# Patient Record
Sex: MALE | Race: WHITE | URBAN - METROPOLITAN AREA
[De-identification: names, ages, dates, MRNs, and addresses within clinical notes are randomized per-mention and may not be internally consistent; named-entity substitution may affect disease eponyms.]

---

## 2017-03-10 DIAGNOSIS — E78.5 HYPERLIPIDEMIA LDL GOAL <100: ICD-10-CM

## 2017-03-10 RX ORDER — ATORVASTATIN CALCIUM 20 MG/1
20 TABLET, FILM COATED ORAL DAILY
Qty: 90 TABLET | Refills: 0 | Status: SHIPPED | OUTPATIENT
Start: 2017-03-10 | End: 2017-06-05

## 2017-06-05 DIAGNOSIS — E78.5 HYPERLIPIDEMIA LDL GOAL <100: ICD-10-CM

## 2017-06-05 RX ORDER — ATORVASTATIN CALCIUM 20 MG/1
20 TABLET, FILM COATED ORAL DAILY
Qty: 30 TABLET | Refills: 0 | Status: SHIPPED | OUTPATIENT
Start: 2017-06-05 | End: 2017-06-06

## 2017-06-06 ENCOUNTER — TELEPHONE (OUTPATIENT)
Dept: CARDIOLOGY | Facility: CLINIC | Age: 37
End: 2017-06-06

## 2017-06-06 DIAGNOSIS — E78.5 HYPERLIPIDEMIA: Primary | ICD-10-CM

## 2017-06-06 DIAGNOSIS — E78.5 HYPERLIPIDEMIA LDL GOAL <100: ICD-10-CM

## 2017-06-06 RX ORDER — ATORVASTATIN CALCIUM 20 MG/1
20 TABLET, FILM COATED ORAL DAILY
Qty: 30 TABLET | Refills: 0 | Status: SHIPPED | OUTPATIENT
Start: 2017-06-06 | End: 2017-11-14

## 2017-06-06 NOTE — TELEPHONE ENCOUNTER
Patient called and left a message stating he needed a refill and a lab order. Reviewed chart and patient did need an FLP. Order placed. Also, refilled Lipitor for 30 days. Patient has office visit June 20, 2017 with Dr. Villasenor. Called patient back. He did not answer. Left a detailed message to patient with scheduling's number to schedule the lab and informed him of the refill sent to [facility]. Team 4 number left.

## 2017-06-15 DIAGNOSIS — E78.5 HYPERLIPIDEMIA: ICD-10-CM

## 2017-06-15 LAB
CHOLEST SERPL-MCNC: 170 MG/DL
HDLC SERPL-MCNC: 58 MG/DL
LDLC SERPL CALC-MCNC: 91 MG/DL
NONHDLC SERPL-MCNC: 112 MG/DL
TRIGL SERPL-MCNC: 104 MG/DL

## 2017-06-15 PROCEDURE — 80061 LIPID PANEL: CPT | Performed by: INTERNAL MEDICINE

## 2017-06-15 PROCEDURE — 36415 COLL VENOUS BLD VENIPUNCTURE: CPT | Performed by: INTERNAL MEDICINE

## 2017-06-20 ENCOUNTER — OFFICE VISIT (OUTPATIENT)
Dept: CARDIOLOGY | Facility: CLINIC | Age: 37
End: 2017-06-20
Payer: COMMERCIAL

## 2017-06-20 VITALS
WEIGHT: 173 LBS | HEIGHT: 69 IN | SYSTOLIC BLOOD PRESSURE: 118 MMHG | DIASTOLIC BLOOD PRESSURE: 72 MMHG | HEART RATE: 56 BPM | BODY MASS INDEX: 25.62 KG/M2

## 2017-06-20 DIAGNOSIS — Z82.49 FAMILY HISTORY OF ISCHEMIC HEART DISEASE: ICD-10-CM

## 2017-06-20 DIAGNOSIS — E78.5 HYPERLIPIDEMIA LDL GOAL <100: Primary | ICD-10-CM

## 2017-06-20 PROCEDURE — 99213 OFFICE O/P EST LOW 20 MIN: CPT | Performed by: INTERNAL MEDICINE

## 2017-06-20 NOTE — PROGRESS NOTES
HPI and Plan:   See dictation:[id number]    No orders of the defined types were placed in this encounter.      Orders Placed This Encounter   Medications     Fexofenadine HCl (ALLEGRA ALLERGY PO)     Sig: Take by mouth as needed for allergies       There are no discontinued medications.      Encounter Diagnoses   Name Primary?     Hyperlipidemia LDL goal <100 Yes     Family history of ischemic heart disease        CURRENT MEDICATIONS:  Current Outpatient Prescriptions   Medication Sig Dispense Refill     Fexofenadine HCl (ALLEGRA ALLERGY PO) Take by mouth as needed for allergies       atorvastatin (LIPITOR) 20 MG tablet Take 1 tablet (20 mg) by mouth daily 30 tablet 0     triamcinolone (KENALOG) 0.025 % cream        aspirin EC 81 MG tablet Take 1 tablet (81 mg) by mouth daily 1 tablet 0       ALLERGIES   No Known Allergies    PAST MEDICAL HISTORY:  Past Medical History:   Diagnosis Date     Hyperlipidemia        PAST SURGICAL HISTORY:  History reviewed. No pertinent surgical history.    FAMILY HISTORY:  Family History   Problem Relation Age of Onset     Hypertension Mother      Hyperlipidemia Mother      Coronary Artery Disease Father 50     MI and CABG     Hyperlipidemia Father      Hyperlipidemia Sister      no medications       SOCIAL HISTORY:  Social History     Social History     Marital status:      Spouse name: N/A     Number of children: N/A     Years of education: N/A     Social History Main Topics     Smoking status: Never Smoker     Smokeless tobacco: None     Alcohol use Yes      Comment: 0-3 socially     Drug use: No     Sexual activity: Not Asked     Other Topics Concern     Parent/Sibling W/ Cabg, Mi Or Angioplasty Before 65f 55m? Yes     Caffeine Concern No     2 coffee or dt. coke     Exercise Yes     3x per week      Seat Belt Yes     Social History Narrative       Review of Systems:  Skin:  Negative       Eyes:  Positive for contacts    ENT:  Negative      Respiratory:  Negative      "  Cardiovascular:  Negative      Gastroenterology: Negative      Genitourinary:  not assessed      Musculoskeletal:  Negative      Neurologic:  Negative      Psychiatric:  Negative      Heme/Lymph/Imm:  Positive for allergies    Endocrine:  Negative        Physical Exam:  Vitals: /72 (BP Location: Left arm, Cuff Size: Adult Large)  Pulse 56  Ht 1.753 m (5' 9\")  Wt 78.5 kg (173 lb)  BMI 25.55 kg/m2    Constitutional:  cooperative, alert and oriented, well developed, well nourished, in no acute distress        Skin:  warm and dry to the touch, no apparent skin lesions or masses noted        Head:  normocephalic, no masses or lesions        Eyes:  pupils equal and round, conjunctivae and lids unremarkable, sclera white, no xanthalasma, EOMS intact, no nystagmus        ENT:  no pallor or cyanosis, dentition good        Neck:  carotid pulses are full and equal bilaterally, JVP normal, no carotid bruit, no thyromegaly        Chest:  normal breath sounds, clear to auscultation, normal A-P diameter, normal symmetry, normal respiratory excursion, no use of accessory muscles     tender to palpation sternum    Cardiac: regular rhythm, normal S1/S2, no S3 or S4, apical impulse not displaced, no murmurs, gallops or rubs                  Abdomen:  abdomen soft, non-tender, BS normoactive, no mass, no HSM, no bruits        Vascular: pulses full and equal, no bruits auscultated                                        Extremities and Back:  no deformities, clubbing, cyanosis, erythema observed;no edema              Neurological:  affect appropriate, oriented to time, person and place;no gross motor deficits              CC  [facility]  [address]     [place] [address]              "

## 2017-06-20 NOTE — MR AVS SNAPSHOT
"              After Visit Summary   2017    Alfonzo Estrada    MRN: [id number]           Patient Information     Date Of Birth          1980        Visit Information        Provider Department      2017 7:15 AM Zay Villasenor MD [facility]        Today's Diagnoses     Hyperlipidemia LDL goal <100    -  1    Family history of ischemic heart disease           Follow-ups after your visit        Who to contact     If you have questions or need follow up information about today's clinic visit or your schedule please contact [facility] directly at [phone].  Normal or non-critical lab and imaging results will be communicated to you by [employer], letter or phone within 4 business days after the clinic has received the results. If you do not hear from us within 7 days, please contact the clinic through [employer] or phone. If you have a critical or abnormal lab result, we will notify you by phone as soon as possible.  Submit refill requests through [employer] or call your pharmacy and they will forward the refill request to us. Please allow 3 business days for your refill to be completed.          Additional Information About Your Visit        MyChart Information     [employer] lets you send messages to your doctor, view your test results, renew your prescriptions, schedule appointments and more. To sign up, go to [email or web address] . Click on \"Log in\" on the left side of the screen, which will take you to the Welcome page. Then click on \"Sign up Now\" on the right side of the page.     You will be asked to enter the access code listed below, as well as some personal information. Please follow the directions to create your username and password.     Your access code is: [email or web address]  Expires: 2017  7:39 AM     Your access code will  in 90 days. If you need help or a new code, please call your [facility] " "or [phone].        Care EveryWhere ID     This is your Care EveryWhere ID. This could be used by other organizations to access your [facility] medical records  [id number]        Your Vitals Were     Pulse Height BMI (Body Mass Index)             56 1.753 m (5' 9\") 25.55 kg/m2          Blood Pressure from Last 3 Encounters:   06/20/17 118/72   02/10/16 128/76   11/16/15 120/75    Weight from Last 3 Encounters:   06/20/17 78.5 kg (173 lb)   02/10/16 83.9 kg (185 lb)   11/16/15 84.8 kg (187 lb)              Today, you had the following     No orders found for display       Primary Care Provider Office Phone # Fax #    Monty Hernandez [phone]       [facility] [address]    [place] [address]        Thank you!     Thank you for choosing [facility] [place]  for your care. Our goal is always to provide you with excellent care. Hearing back from our patients is one way we can continue to improve our services. Please take a few minutes to complete the written survey that you may receive in the mail after your visit with us. Thank you!             Your Updated Medication List - Protect others around you: Learn how to safely use, store and throw away your medicines at www.disposemymeds.org.          This list is accurate as of: 6/20/17  7:39 AM.  Always use your most recent med list.                   Brand Name Dispense Instructions for use    ALLEGRA ALLERGY PO      Take by mouth as needed for allergies       aspirin EC 81 MG EC tablet     1 tablet    Take 1 tablet (81 mg) by mouth daily       atorvastatin 20 MG tablet    LIPITOR    30 tablet    Take 1 tablet (20 mg) by mouth daily       triamcinolone 0.025 % cream    KENALOG           "

## 2017-06-20 NOTE — PROGRESS NOTES
PRIMARY CARE PHYSICIAN:  Monty Hernandez MD      HISTORY OF PRESENT ILLNESS:  I again had the pleasure of seeing your patient, Alfonzo Estrada, at [facility] for evaluation of mixed hyperlipidemia.  The patient has familial hypercholesterolemia since the age of 11.  This has improved somewhat with a better diet.  His father developed a myocardial infarction at age 50 and had 3-vessel coronary artery bypass surgery.  The patient's total cholesterol was greater than 200 while unmedicated in college.  With the use of simvastatin, this was significantly improved.  We have placed him on atorvastatin 20 mg, which he has tolerated without myalgias or myopathy.  The patient continues to exercise by running 4-5 times per week.  He is in the middle of a bathroom remodeling project and is exercising a bit less.  Additionally, his medication is not always available in his house because of the remodeling and he is able to take the atorvastatin two-thirds of the time.  He denies palpitations or chest discomfort.  A previous stress echocardiogram 07/09/2012 was normal.  The patient states that his diet consists of increased grains, chicken and turkey, and he is avoiding red meat.  He is currently working for [employer] and is quite active at work.  He is a nonsmoker and does not have a history of hypertension or previous myocardial infarction or stroke.      His most recent fasting lipid profile on 06/15/2017 includes total cholesterol 170, HDL 58, LDL 91, triglycerides 104.      PHYSICAL EXAMINATION:   VITAL SIGNS:  Current blood pressure is 118/72, pulse is 56 and regular, weight is 173 pounds, a decrease of 15 pounds since I last saw him in 11/2015.   HEENT:  Benign without xanthelasma.   NECK:  Supple without thyromegaly or carotid bruit.   CHEST:  Clear to auscultation without wheezes, rales or rhonchi.   CARDIAC:  Regular rate and rhythm without gallop or murmur.  No JVD.  Pulses were all  intact without bruits.   ABDOMEN:  Benign without organomegaly or bruits.   EXTREMITIES:  Without cyanosis, clubbing or edema.      ASSESSMENT:   1.  Alfonzo Eric is a delightful 36-year-old male with history of mixed hyperlipidemia.  Because of his strong family history of premature atherosclerosis, we elected to treat him with a modest dose of atorvastatin.  He has had a remarkable result and continues to do well.  I have suggested to this patient that he continue on this dose of medications.  He can follow with Dr. Monty Hernandez and should have a fasting lipid profile every 2 years.  He will return to my office on a p.r.n. basis.  I have suggested he continue to exercise and complimented him on his current diet as well.  He will also continue with aspirin 81 mg daily.  His LDL goal remains less than 130, although keeping him under 100 seems reasonable with this dose of atorvastatin.      It is my pleasure to assist in the care of Alfonzo Eric.  I will see him on a p.r.n. basis in the future.  I congratulated him on his great health practices.  All his questions were answered to his satisfaction.      Radha Kaur MD      cc:   Monty Hernandez MD   [facility]   [address] , [address] [address]         RAD[facility] KAUR MD, [facility]             D: 2017 07:50   T: 2017 17:19   MT: al      Name:     ALFONZO ERIC   MRN:      [id number]        Account:      [id number]   :      1980           Service Date: 2017      Document: [id number]

## 2017-06-20 NOTE — LETTER
6/20/2017    Monty Hernandez MD  [facility]  [address]   [place] [address]    RE: Alfonzo Estrada       Dear Colleague,    PRIMARY CARE PHYSICIAN:  Monty Hernandez MD      I again had the pleasure of seeing your patient, Alfonzo Estrada, at [facility] for evaluation of mixed hyperlipidemia.  The patient has familial hypercholesterolemia since the age of 11.  This has improved somewhat with a better diet.  His father developed a myocardial infarction at age 50 and had 3-vessel coronary artery bypass surgery.  The patient's total cholesterol was greater than 200 while unmedicated in college.  With the use of simvastatin, this was significantly improved.  We have placed him on atorvastatin 20 mg, which he has tolerated without myalgias or myopathy.  The patient continues to exercise by running 4-5 times per week.  He is in the middle of a bathroom remodeling project and is exercising a bit less.  Additionally, his medication is not always available in his house because of the remodeling and he is able to take the atorvastatin two-thirds of the time.  He denies palpitations or chest discomfort.  A previous stress echocardiogram 07/09/2012 was normal.  The patient states that his diet consists of increased grains, chicken and turkey, and he is avoiding red meat.  He is currently working for [employer] and is quite active at work.  He is a nonsmoker and does not have a history of hypertension or previous myocardial infarction or stroke.      His most recent fasting lipid profile on 06/15/2017 includes total cholesterol 170, HDL 58, LDL 91, triglycerides 104.      PHYSICAL EXAMINATION:   VITAL SIGNS:  Current blood pressure is 118/72, pulse is 56 and regular, weight is 173 pounds, a decrease of 15 pounds since I last saw him in 11/2015.   HEENT:  Benign without xanthelasma.   NECK:  Supple without thyromegaly or carotid bruit.   CHEST:  Clear to auscultation  without wheezes, rales or rhonchi.   CARDIAC:  Regular rate and rhythm without gallop or murmur.  No JVD.  Pulses were all intact without bruits.   ABDOMEN:  Benign without organomegaly or bruits.   EXTREMITIES:  Without cyanosis, clubbing or edema.     Outpatient Encounter Prescriptions as of 6/20/2017   Medication Sig Dispense Refill     Fexofenadine HCl (ALLEGRA ALLERGY PO) Take by mouth as needed for allergies       atorvastatin (LIPITOR) 20 MG tablet Take 1 tablet (20 mg) by mouth daily 30 tablet 0     triamcinolone (KENALOG) 0.025 % cream        aspirin EC 81 MG tablet Take 1 tablet (81 mg) by mouth daily 1 tablet 0     No facility-administered encounter medications on file as of 6/20/2017.       ASSESSMENT:   1.  Alfonzo Estrada is a delightful 36-year-old male with history of mixed hyperlipidemia.  Because of his strong family history of premature atherosclerosis, we elected to treat him with a modest dose of atorvastatin.  He has had a remarkable result and continues to do well.  I have suggested to this patient that he continue on this dose of medications.  He can follow with Dr. Monty Hernandez and should have a fasting lipid profile every 2 years.  He will return to my office on a p.r.n. basis.  I have suggested he continue to exercise and complimented him on his current diet as well.  He will also continue with aspirin 81 mg daily.  His LDL goal remains less than 130, although keeping him under 100 seems reasonable with this dose of atorvastatin.      It is my pleasure to assist in the care of Alfonzo Estrada.  I will see him on a p.r.n. basis in the future. I congratulated him on his great health practices.  All his questions were answered to his satisfaction.     Sincerely,    Zay Villasenor MD     [facility]

## 2017-11-14 DIAGNOSIS — E78.5 HYPERLIPIDEMIA LDL GOAL <100: ICD-10-CM

## 2017-11-14 RX ORDER — ATORVASTATIN CALCIUM 20 MG/1
20 TABLET, FILM COATED ORAL DAILY
Qty: 90 TABLET | Refills: 3 | Status: SHIPPED | OUTPATIENT
Start: 2017-11-14